# Patient Record
Sex: FEMALE | Race: WHITE | Employment: FULL TIME | URBAN - METROPOLITAN AREA
[De-identification: names, ages, dates, MRNs, and addresses within clinical notes are randomized per-mention and may not be internally consistent; named-entity substitution may affect disease eponyms.]

---

## 2024-01-29 ENCOUNTER — OFFICE VISIT (OUTPATIENT)
Age: 49
End: 2024-01-29
Payer: OTHER GOVERNMENT

## 2024-01-29 VITALS — WEIGHT: 178.2 LBS | HEIGHT: 60 IN | BODY MASS INDEX: 34.99 KG/M2

## 2024-01-29 DIAGNOSIS — M70.51 BURSITIS OF OTHER BURSA OF RIGHT KNEE: Primary | ICD-10-CM

## 2024-01-29 PROCEDURE — 76882 US LMTD JT/FCL EVL NVASC XTR: CPT | Performed by: ORTHOPAEDIC SURGERY

## 2024-01-29 PROCEDURE — 99203 OFFICE O/P NEW LOW 30 MIN: CPT | Performed by: ORTHOPAEDIC SURGERY

## 2024-01-29 RX ORDER — MELOXICAM 15 MG/1
15 TABLET ORAL DAILY
Qty: 30 TABLET | Refills: 3 | Status: SHIPPED | OUTPATIENT
Start: 2024-01-29

## 2024-01-29 NOTE — PROGRESS NOTES
Ban Gaviria  1975   Chief Complaint   Patient presents with    Knee Pain     rt        HISTORY OF PRESENT ILLNESS  Ban Gaviria is a 48 y.o. female who presents today for evaluation of right knee pain.  Pain is a 7/10. Pain has been present for 2 weeks. Pt presented to the ED on 01/22/2024 with c/o bilateral knee pain secondary to trip and fall. She was walking on a sidewalk and tripped and fell straight down and landed on both of her knees 2 weeks ago. She notes swelling and describes soreness in her right knee. The patient states that she feels pain while walking, but she claims that her pain has improved since injury. The patient endorses night pain and swelling in her right knee.     Has tried following treatments: Injections:No; Brace:No; Therapy:No; Cane/Crutch:No      Not on File     History reviewed. No pertinent past medical history.   Social History    None        History reviewed. No pertinent surgical history.   History reviewed. No pertinent family history.  No current outpatient medications on file.     No current facility-administered medications for this visit.       REVIEW OF SYSTEM   Patient denies: Weight loss, Fever/Chills, HA, Visual changes, Fatigue, Chest pain, SOB, Abdominal pain, N/V/D/C, Blood in stool or urine, Edema.   Pertinent positive as above in HPI. All others were negative    PHYSICAL EXAM:   Ht 1.524 m (5')   Wt 80.8 kg (178 lb 3.2 oz)   BMI 34.80 kg/m²   The patient is a well-developed, well-nourished female   in no acute distress.  The patient is alert and oriented times three.  The patient is alert and oriented times three. Mood and affect are normal.  LYMPHATIC: lymph nodes are not enlarged and are within normal limits  SKIN: normal in color and non tender to palpation. There are no bruises or abrasions noted.   NEUROLOGICAL: Motor sensory exam is within normal limits. Reflexes are equal bilaterally. There is normal sensation to pinprick and light